# Patient Record
Sex: FEMALE | Race: WHITE
[De-identification: names, ages, dates, MRNs, and addresses within clinical notes are randomized per-mention and may not be internally consistent; named-entity substitution may affect disease eponyms.]

---

## 2017-11-19 ENCOUNTER — HOSPITAL ENCOUNTER (EMERGENCY)
Dept: HOSPITAL 76 - ED | Age: 22
Discharge: HOME | End: 2017-11-19
Payer: MEDICAID

## 2017-11-19 VITALS — DIASTOLIC BLOOD PRESSURE: 71 MMHG | SYSTOLIC BLOOD PRESSURE: 135 MMHG

## 2017-11-19 DIAGNOSIS — R55: Primary | ICD-10-CM

## 2017-11-19 LAB
ALBUMIN/GLOB SERPL: 1.1 {RATIO} (ref 1–2.2)
ANION GAP SERPL CALCULATED.4IONS-SCNC: 12 MMOL/L (ref 6–13)
BASOPHILS NFR BLD AUTO: 0.2 10^3/UL (ref 0–0.1)
BASOPHILS NFR BLD AUTO: 1.2 %
BILIRUB BLD-MCNC: 0.8 MG/DL (ref 0.2–1)
BUN SERPL-MCNC: 15 MG/DL (ref 6–20)
CALCIUM UR-MCNC: 9.2 MG/DL (ref 8.5–10.3)
CHLORIDE SERPL-SCNC: 102 MMOL/L (ref 101–111)
CO2 SERPL-SCNC: 24 MMOL/L (ref 21–32)
CREAT SERPLBLD-SCNC: 0.7 MG/DL (ref 0.4–1)
CUL URINE ADD CHARGE: (no result)
EOSINOPHIL # BLD AUTO: 0 10^3/UL (ref 0–0.7)
EOSINOPHIL NFR BLD AUTO: 0.2 %
ERYTHROCYTE [DISTWIDTH] IN BLOOD BY AUTOMATED COUNT: 12.5 % (ref 12–15)
GFRSERPLBLD MDRD-ARVRAT: 105 ML/MIN/{1.73_M2} (ref 89–?)
GLOBULIN SER-MCNC: 3.8 G/DL (ref 2.1–4.2)
GLUCOSE SERPL-MCNC: 119 MG/DL (ref 70–100)
HCG UR QL: NEGATIVE
HCT VFR BLD AUTO: 42 % (ref 37–47)
HGB UR QL STRIP: 14.2 G/DL (ref 12–16)
LIPASE SERPL-CCNC: 26 U/L (ref 22–51)
LYMPHOCYTES # SPEC AUTO: 1.8 10^3/UL (ref 1.5–3.5)
LYMPHOCYTES NFR BLD AUTO: 11.2 %
MCH RBC QN AUTO: 31.2 PG (ref 27–31)
MCHC RBC AUTO-ENTMCNC: 33.7 G/DL (ref 32–36)
MCV RBC AUTO: 92.6 FL (ref 81–99)
MONOCYTES # BLD AUTO: 1.3 10^3/UL (ref 0–1)
MONOCYTES NFR BLD AUTO: 8.2 %
NEUTROPHILS # BLD AUTO: 12.4 10^3/UL (ref 1.5–6.6)
NEUTROPHILS # SNV AUTO: 15.6 X10^3/UL (ref 4.8–10.8)
NEUTROPHILS NFR BLD AUTO: 79.2 %
NRBC # BLD AUTO: 0 /100WBC
PDW BLD AUTO: 8.3 FL (ref 7.9–10.8)
PH UR STRIP.AUTO: 6 PH (ref 5–7.5)
POTASSIUM SERPL-SCNC: 3.3 MMOL/L (ref 3.5–5)
PROT SPEC-MCNC: 8 G/DL (ref 6.7–8.2)
RBC MAR: 4.54 10^6/UL (ref 4.2–5.4)
SODIUM SERPLBLD-SCNC: 138 MMOL/L (ref 135–145)
SP GR UR STRIP.AUTO: 1.02 (ref 1–1.03)
UA CHARGE (STRIP ONLY): (no result)
UA W/ MICROSCOPIC CHARGE: YES
UR CULTURE IF IND: (no result)
UROBILINOGEN UR STRIP.AUTO-MCNC: (no result) MG/DL
WBC # BLD: 15.6 X10^3/UL
WBC # UR MANUAL: (no result) /HPF (ref 0–5)

## 2017-11-19 PROCEDURE — 81003 URINALYSIS AUTO W/O SCOPE: CPT

## 2017-11-19 PROCEDURE — 96360 HYDRATION IV INFUSION INIT: CPT

## 2017-11-19 PROCEDURE — 36415 COLL VENOUS BLD VENIPUNCTURE: CPT

## 2017-11-19 PROCEDURE — 84484 ASSAY OF TROPONIN QUANT: CPT

## 2017-11-19 PROCEDURE — 80053 COMPREHEN METABOLIC PANEL: CPT

## 2017-11-19 PROCEDURE — 71010: CPT

## 2017-11-19 PROCEDURE — 83690 ASSAY OF LIPASE: CPT

## 2017-11-19 PROCEDURE — 71275 CT ANGIOGRAPHY CHEST: CPT

## 2017-11-19 PROCEDURE — 81025 URINE PREGNANCY TEST: CPT

## 2017-11-19 PROCEDURE — 85379 FIBRIN DEGRADATION QUANT: CPT

## 2017-11-19 PROCEDURE — 99283 EMERGENCY DEPT VISIT LOW MDM: CPT

## 2017-11-19 PROCEDURE — 87086 URINE CULTURE/COLONY COUNT: CPT

## 2017-11-19 PROCEDURE — 81001 URINALYSIS AUTO W/SCOPE: CPT

## 2017-11-19 PROCEDURE — 99284 EMERGENCY DEPT VISIT MOD MDM: CPT

## 2017-11-19 PROCEDURE — 93005 ELECTROCARDIOGRAM TRACING: CPT

## 2017-11-19 PROCEDURE — 85025 COMPLETE CBC W/AUTO DIFF WBC: CPT

## 2017-11-19 NOTE — XRAY REPORT
EXAM: 

CHEST RADIOGRAPHY

 

EXAM DATE: 11/19/2017 08:46 PM.

 

CLINICAL HISTORY: Syncope.

 

COMPARISON: None.

 

TECHNIQUE: 1 view.

 

FINDINGS:

Lungs/Pleura: No focal opacities evident. No pleural effusion. No pneumothorax.

 

Mediastinum: Within exam limitations, the cardiomediastinal contour is normal.

 

Other: None.

 

IMPRESSION: Normal single view chest.

 

[facility]

Referring Provider Line: [phone]

 

SITE ID: [id number]

## 2017-11-19 NOTE — XRAY PRELIMINARY REPORT
Accession: [id number]

Exam: XR CHEST 1 VIEW

 

IMPRESSION: Normal single view chest.

 

[facility]

 

SITE ID: [id number]

## 2017-11-19 NOTE — ED PHYSICIAN DOCUMENTATION
PD HPI SYNCOPE





- Stated complaint


Stated Complaint: SYNCOPE





- Chief complaint


Chief Complaint: Neuro





- History obtained from


History obtained from: Patient, Family





- History of Present Illness


Witnessed: Witnessed


Timing - onset: Today


Duration: Seconds (5)


Preceding symptoms: Light headed, Generalized weakness, Other (nausea)


Associated symptoms: No: Seizure, Incontinant of urine, Incontinant of stool, 

Headache, Vision changes, Chest pain, Palpitations, Diaphoresis, Dyspnea, 

Abdominal pain


Contributing factors: Other (was in a hot shower)


Injury occurred: None.  No: Fell, Head injury, Neck injury, Bit tongue


Pain level max: 0


Pain level now: 0


Similar symptoms before: Has not had sx before


Recently seen: Not recently seen





Review of Systems


Constitutional: denies: Fever, Chills


GI: denies: Vomiting, Diarrhea


: denies: Dysuria, Frequency, Hesitancy, Now pregnant EGA


Skin: denies: Rash


Musculoskeletal: denies: Neck pain, Back pain


Neurologic: denies: Generalized weakness, Focal weakness, Numbness, Seizure, 

Confused, Altered mental status, Headache, Head injury, LOC





PD PAST MEDICAL HISTORY





- Past Medical History


Past Medical History: No





- Past Surgical History


Past Surgical History: No





- Present Medications


Home Medications: 


 Ambulatory Orders











 Medication  Instructions  Recorded  Confirmed


 


No Known Home Medications [No  11/19/17 11/19/17





Known Home Medications]   














- Allergies


Allergies/Adverse Reactions: 


 Allergies











Allergy/AdvReac Type Severity Reaction Status Date / Time


 


No Known Drug Allergies Allergy   Verified 11/19/17 19:29














- Social History


Does the pt smoke?: No


Smoking Status: Never smoker


Does the pt drink ETOH?: No


Does the pt have substance abuse?: No





- Immunizations


Immunizations are current?: Yes





PD ED PE NORMAL





- Vitals


Vital signs reviewed: Yes





- General


General: Alert and oriented X 3, No acute distress, Well developed/nourished





- HEENT


HEENT: Atraumatic, PERRL, Ears normal, Moist mucous membranes





- Neck


Neck: Supple, no meningeal sign, No bony TTP





- Cardiac


Cardiac: RRR, No murmur, Strong equal pulses





- Respiratory


Respiratory: No respiratory distress, Clear bilaterally





- Abdomen


Abdomen: Soft, Non tender, Non distended





- Back


Back: No CVA TTP, No spinal TTP





- Derm


Derm: Warm and dry, No rash





- Extremities


Extremities: No edema, No calf tenderness / cord





- Neuro


Neuro: Alert and oriented X 3, CNs 2-12 intact, No motor deficit, No sensory 

deficit, Normal speech


Eye Opening: Spontaneous


Motor: Obeys Commands


Verbal: Oriented


GCS Score: 15





- Psych


Psych: Normal mood, Normal affect





Results





- Vitals


Vitals: 


 Vital Signs - 24 hr











  11/19/17 11/19/17 11/19/17





  19:20 19:39 21:22


 


Temperature 37.1 C  37.2 C


 


Heart Rate 112 H 110 H 


 


Respiratory 18 18 





Rate   


 


Blood Pressure 142/102 H 140/86 H 


 


O2 Saturation 98 100 














  11/19/17





  22:05


 


Temperature 


 


Heart Rate 75


 


Respiratory 18





Rate 


 


Blood Pressure 139/72 H


 


O2 Saturation 97








 Oxygen











O2 Source                      Room air

















- EKG (time done)


  ** 2003


Rate: Rate (enter#) (76)


Rhythm: NSR (sinus arrhythmia)


Axis: Normal


Intervals: Normal DE


QRS: Normal


Ischemia: Normal ST segments


Computer interpretation: Agree with computer





- Labs


Labs: 


 Laboratory Tests











  11/19/17 11/19/17 11/19/17





  19:54 19:54 19:54


 


WBC  15.6 H  


 


RBC  4.54  


 


Hgb  14.2  


 


Hct  42.0  


 


MCV  92.6  


 


MCH  31.2 H  


 


MCHC  33.7  


 


RDW  12.5  


 


Plt Count  319  


 


MPV  8.3  


 


Neut #  12.4 H  


 


Lymph #  1.8  


 


Mono #  1.3 H  


 


Eos #  0.0  


 


Baso #  0.2 H  


 


Absolute Nucleated RBC  0.00  


 


Nucleated RBC %  0.0  


 


D-Dimer    536.4 H


 


Sodium   138 


 


Potassium   3.3 L 


 


Chloride   102 


 


Carbon Dioxide   24 


 


Anion Gap   12.0 


 


BUN   15 


 


Creatinine   0.7 


 


Estimated GFR (MDRD)   105 


 


Glucose   119 H 


 


Calcium   9.2 


 


Total Bilirubin   0.8 


 


AST   20 


 


ALT   14 


 


Alkaline Phosphatase   39 L 


 


Troponin I   


 


Total Protein   8.0 


 


Albumin   4.2 


 


Globulin   3.8 


 


Albumin/Globulin Ratio   1.1 


 


Lipase   26 


 


Urine Color   


 


Urine Clarity   


 


Urine pH   


 


Ur Specific Gravity   


 


Urine Protein   


 


Urine Glucose (UA)   


 


Urine Ketones   


 


Urine Occult Blood   


 


Urine Nitrite   


 


Urine Bilirubin   


 


Urine Urobilinogen   


 


Ur Leukocyte Esterase   


 


Urine RBC   


 


Urine WBC   


 


Ur Squamous Epith Cells   


 


Urine Bacteria   


 


Ur Microscopic Review   


 


Urine Culture Comments   


 


Urine HCG, Qual   














  11/19/17 11/19/17





  19:54 20:25


 


WBC  


 


RBC  


 


Hgb  


 


Hct  


 


MCV  


 


MCH  


 


MCHC  


 


RDW  


 


Plt Count  


 


MPV  


 


Neut #  


 


Lymph #  


 


Mono #  


 


Eos #  


 


Baso #  


 


Absolute Nucleated RBC  


 


Nucleated RBC %  


 


D-Dimer  


 


Sodium  


 


Potassium  


 


Chloride  


 


Carbon Dioxide  


 


Anion Gap  


 


BUN  


 


Creatinine  


 


Estimated GFR (MDRD)  


 


Glucose  


 


Calcium  


 


Total Bilirubin  


 


AST  


 


ALT  


 


Alkaline Phosphatase  


 


Troponin I  < 0.04 


 


Total Protein  


 


Albumin  


 


Globulin  


 


Albumin/Globulin Ratio  


 


Lipase  


 


Urine Color   YELLOW


 


Urine Clarity   CLOUDY


 


Urine pH   6.0


 


Ur Specific Gravity   1.025


 


Urine Protein   TRACE


 


Urine Glucose (UA)   NEGATIVE


 


Urine Ketones   TRACE


 


Urine Occult Blood   SMALL H


 


Urine Nitrite   NEGATIVE


 


Urine Bilirubin   SMALL H


 


Urine Urobilinogen   0.2 (NORMAL)


 


Ur Leukocyte Esterase   NEGATIVE


 


Urine RBC   6-10 H


 


Urine WBC   4-5


 


Ur Squamous Epith Cells   NONE SEEN


 


Urine Bacteria   Rare


 


Ur Microscopic Review   INDICATED


 


Urine Culture Comments   NOT INDICATED


 


Urine HCG, Qual   NEGATIVE














- Rads (name of study)


  ** cxr


Radiology: Prelim report reviewed, EMP read contemporaneously, See rad report (

normal)





  ** CT PA


Radiology: Prelim report reviewed, EMP read contemporaneously, See rad report (

No evidence for pulmonary emboli. Mild to moderate motion artifact limited. No 

acute findings are seen)





PD MEDICAL DECISION MAKING





- ED course


Complexity details: reviewed results, re-evaluated patient, considered 

differential, d/w patient, d/w family


ED course: 


Patient is a 22-year-old female who presents to the emergency department with 

syncope today.  This was occurred while she was in the shower, likely 

vasovagal.  She is well-appearing, nontoxic.  No acute findings on EKG.  Her 

white blood cell count was elevated and she was tachycardic and denied any 

recent illnesses originally, therefore a d-dimer was performed which was 

elevated.  CT scan was then undertaken which is negative for PE. Patient then 

stated that she had been sick with a viral URI over the past few weeks.  We 

will continue supportive care and follow-up with her PCP.  Patient counseled 

regarding signs and symptoms for which I believe and urgent re-evaluation would 

be necessary. Patient with good understanding of and agreement to plan and is 

comfortable going home at this time





This document was made in part using voice recognition software. While efforts 

are made to proofread this document, sound alike and grammatical errors may 

occur.





Departure





- Departure


Disposition: 01 Home, Self Care


Clinical Impression: 


 Vasovagal syncope





Condition: Good


Instructions:  ED Syncope Vasovagal


Follow-Up: 


Jovita Farmer MD [Primary Care Provider] - Within 1 week


Comments: 


Drink plenty of fluids at home. Your tests are normal tonight.

## 2017-11-19 NOTE — CT PRELIMINARY REPORT
Accession: [id number]

Exam: CT CHEST ANGIO (PE)

 

IMPRESSION: 

1. No evidence for pulmonary emboli. Mild to moderate motion artifact limited. No acute findings are 
seen.

 

[facility]

 

SITE ID: [id number]

## 2017-11-19 NOTE — CT REPORT
EXAM:

CT ANGIOGRAM CHEST

 

EXAM DATE: 11/19/2017 09:49 PM.

 

CLINICAL HISTORY: Syncope, elevated d-dimer.

 

COMPARISON: None.

 

TECHNIQUE: Routine helical imaging was performed through the chest in the pulmonary arterial phase. I
V Contrast: 80 mL Isovue 300. Reconstructions: Coronal 3-D MIP reconstructions.Sagittal and coronal.

 

In accordance with CT protocol optimization, one or more of the following dose reduction techniques w
ere utilized for this exam: automated exposure control, adjustment of mA and/or KV based on patient s
ize, or use of iterative reconstructive technique.

 

FINDINGS: 

Pulmonary Arteries: No evidence for pulmonary emboli. Mild to moderate motion artifact limited.

 

Lungs/Pleura: No consolidation, nodules, or edema. No effusions or pneumothorax. Motion artifact limi
tai.

 

Mediastinum: Normal. No cardiac enlargement or adenopathy. 

 

Thoracic Aorta: Unremarkable.

 

Upper Abdomen: Unremarkable.

 

 

IMPRESSION: 

1. No evidence for pulmonary emboli. Mild to moderate motion artifact limited. No acute findings are 
seen.

 

[facility]

Referring Provider Line: [phone]

 

SITE ID: [id number]

## 2022-01-28 ENCOUNTER — HOSPITAL ENCOUNTER (EMERGENCY)
Dept: HOSPITAL 76 - ED | Age: 27
Discharge: HOME | End: 2022-01-28
Payer: MEDICAID

## 2022-01-28 VITALS — SYSTOLIC BLOOD PRESSURE: 142 MMHG | DIASTOLIC BLOOD PRESSURE: 87 MMHG

## 2022-01-28 DIAGNOSIS — R42: ICD-10-CM

## 2022-01-28 DIAGNOSIS — G89.18: Primary | ICD-10-CM

## 2022-01-28 DIAGNOSIS — R51.9: ICD-10-CM

## 2022-01-28 DIAGNOSIS — H53.8: ICD-10-CM

## 2022-01-28 LAB
ALBUMIN DIAFP-MCNC: 3.4 G/DL (ref 3.2–5.5)
ALBUMIN/GLOB SERPL: 0.8 {RATIO} (ref 1–2.2)
ALP SERPL-CCNC: 105 IU/L (ref 42–121)
ALT SERPL W P-5'-P-CCNC: 18 IU/L (ref 10–60)
ANION GAP SERPL CALCULATED.4IONS-SCNC: 11 MMOL/L (ref 6–13)
AST SERPL W P-5'-P-CCNC: 17 IU/L (ref 10–42)
BASOPHILS NFR BLD AUTO: 0.1 10^3/UL (ref 0–0.1)
BASOPHILS NFR BLD AUTO: 0.9 %
BILIRUB BLD-MCNC: 0.4 MG/DL (ref 0.2–1)
BUN SERPL-MCNC: 19 MG/DL (ref 6–20)
CALCIUM UR-MCNC: 9.6 MG/DL (ref 8.5–10.3)
CHLORIDE SERPL-SCNC: 105 MMOL/L (ref 101–111)
CLARITY UR REFRACT.AUTO: (no result)
CO2 SERPL-SCNC: 22 MMOL/L (ref 21–32)
CREAT SERPLBLD-SCNC: 0.6 MG/DL (ref 0.4–1)
EOSINOPHIL # BLD AUTO: 0.2 10^3/UL (ref 0–0.7)
EOSINOPHIL NFR BLD AUTO: 1.5 %
ERYTHROCYTE [DISTWIDTH] IN BLOOD BY AUTOMATED COUNT: 16 % (ref 12–15)
GFRSERPLBLD MDRD-ARVRAT: 121 ML/MIN/{1.73_M2} (ref 89–?)
GLOBULIN SER-MCNC: 4.4 G/DL (ref 2.1–4.2)
GLUCOSE SERPL-MCNC: 100 MG/DL (ref 70–100)
GLUCOSE UR QL STRIP.AUTO: NEGATIVE MG/DL
HCT VFR BLD AUTO: 34.2 % (ref 37–47)
HGB UR QL STRIP: 11.6 G/DL (ref 12–16)
KETONES UR QL STRIP.AUTO: NEGATIVE MG/DL
LYMPHOCYTES # SPEC AUTO: 2.7 10^3/UL (ref 1.5–3.5)
LYMPHOCYTES NFR BLD AUTO: 22.9 %
MAGNESIUM SERPL-MCNC: 2 MG/DL (ref 1.7–2.8)
MCH RBC QN AUTO: 30.1 PG (ref 27–31)
MCHC RBC AUTO-ENTMCNC: 33.9 G/DL (ref 32–36)
MCV RBC AUTO: 88.6 FL (ref 81–99)
MONOCYTES # BLD AUTO: 0.9 10^3/UL (ref 0–1)
MONOCYTES NFR BLD AUTO: 8 %
NEUTROPHILS # BLD AUTO: 7.7 10^3/UL (ref 1.5–6.6)
NEUTROPHILS # SNV AUTO: 11.7 X10^3/UL (ref 4.8–10.8)
NEUTROPHILS NFR BLD AUTO: 65.8 %
NITRITE UR QL STRIP.AUTO: NEGATIVE
NRBC # BLD AUTO: 0 /100WBC
NRBC # BLD AUTO: 0 X10^3/UL
PDW BLD AUTO: 8.6 FL (ref 7.9–10.8)
PH UR STRIP.AUTO: 5.5 PH (ref 5–7.5)
PLATELET # BLD: 551 10^3/UL (ref 130–450)
POTASSIUM SERPL-SCNC: 3.8 MMOL/L (ref 3.5–5)
PROT SPEC-MCNC: 7.8 G/DL (ref 6.7–8.2)
PROT UR STRIP.AUTO-MCNC: NEGATIVE MG/DL
RBC # UR STRIP.AUTO: (no result) /UL
RBC # URNS HPF: (no result) /HPF (ref 0–5)
RBC MAR: 3.86 10^6/UL (ref 4.2–5.4)
SODIUM SERPLBLD-SCNC: 138 MMOL/L (ref 135–145)
SP GR UR STRIP.AUTO: >=1.03 (ref 1–1.03)
SQUAMOUS URNS QL MICRO: (no result)
UROBILINOGEN UR QL STRIP.AUTO: (no result) E.U./DL
UROBILINOGEN UR STRIP.AUTO-MCNC: NEGATIVE MG/DL
WBC # UR MANUAL: (no result) /HPF (ref 0–5)

## 2022-01-28 PROCEDURE — 99284 EMERGENCY DEPT VISIT MOD MDM: CPT

## 2022-01-28 PROCEDURE — 84156 ASSAY OF PROTEIN URINE: CPT

## 2022-01-28 PROCEDURE — 87086 URINE CULTURE/COLONY COUNT: CPT

## 2022-01-28 PROCEDURE — 81001 URINALYSIS AUTO W/SCOPE: CPT

## 2022-01-28 PROCEDURE — 80053 COMPREHEN METABOLIC PANEL: CPT

## 2022-01-28 PROCEDURE — 83735 ASSAY OF MAGNESIUM: CPT

## 2022-01-28 PROCEDURE — 36415 COLL VENOUS BLD VENIPUNCTURE: CPT

## 2022-01-28 PROCEDURE — 81003 URINALYSIS AUTO W/O SCOPE: CPT

## 2022-01-28 PROCEDURE — 85025 COMPLETE CBC W/AUTO DIFF WBC: CPT

## 2022-01-28 NOTE — ED PHYSICIAN DOCUMENTATION
History of Present Illness





- Stated complaint


Stated Complaint: BLURRY VISION/POST C SECTION





- Chief complaint


Chief Complaint: Neuro





- History obtained from


History obtained from: Patient





- Additonal information


Additional information: 





G1, P1 previously healthy, with PMH of iron deficiency anemia only during 

pregnancy, status post   [facility] from [facility] [place] (went into labor at 41 weeks gestation c/by peripartum 

hypertension and underwent  for failure to progress) p/w transient 

nausea, headache, lightheadedness yesterday and 30 min episode of blurry vision 

today, now resolved. sent in by her ob gyn. patient reports she had 

uncomplicated pregnancy prior to delivery, had uncomplicated c section and was 

dc'd after 36h. urinating and defecating normally. denies pain or fever. infant 

is also doing well and had no nicu stay. 





Review of Systems


Ten Systems: 10 systems reviewed and negative


Constitutional: denies: Fever, Chills


Cardiac: denies: Chest pain / pressure


Respiratory: denies: Dyspnea, Cough


GI: reports: Nausea.  denies: Abdominal Pain, Vomiting


: denies: Dysuria


Neurologic: reports: Headache, Other (vision changes)





PD PAST MEDICAL HISTORY





- Past Surgical History


Past Surgical History: No





- Present Medications


Home Medications: 


                                Ambulatory Orders











 Medication  Instructions  Recorded  Confirmed


 


No Known Home Medications  17














- Allergies


Allergies/Adverse Reactions: 


                                    Allergies











Allergy/AdvReac Type Severity Reaction Status Date / Time


 


No Known Drug Allergies Allergy   Verified 22 20:19














- Social History


Does the pt smoke?: No


Smoking Status: Never smoker


Does the pt drink ETOH?: No


Does the pt have substance abuse?: No





- Immunizations


Immunizations are current?: Yes





PD ED PE NORMAL





- Vitals


Vital signs reviewed: Yes





- General


General: Alert and oriented X 3, No acute distress, Well developed/nourished





- HEENT


HEENT: Atraumatic, PERRL, EOMI





- Neck


Neck: Supple, no meningeal sign





- Cardiac


Cardiac: RRR





- Respiratory


Respiratory: No respiratory distress, Clear bilaterally





- Abdomen


Abdomen: Non tender, Non distended, Other (horizontal incision site at lower 

abdomen clean, nontender, healing well)





- Back


Back: No CVA TTP





- Derm


Derm: Normal color, Warm and dry





- Extremities


Extremities: No deformity, No edema





- Neuro


Neuro: Alert and oriented X 3, No motor deficit, No sensory deficit, Normal 

speech





- Psych


Psych: Normal mood, Normal affect





Results





- Vitals


Vitals: 


                               Vital Signs - 24 hr











  22





  20:14 20:18 20:35


 


Temperature 36.2 C L  


 


Heart Rate 101 H 93 61


 


Respiratory 18 22 20





Rate   


 


Blood Pressure 143/88 H 134/92 H 142/87 H


 


O2 Saturation 97 98 98














  22





  21:42


 


Temperature 


 


Heart Rate 60


 


Respiratory 20





Rate 


 


Blood Pressure 142/87 H


 


O2 Saturation 98








                                     Oxygen











O2 Source                      Room air

















- Labs


Labs: 


                                Laboratory Tests











  22





  20:40 20:40 20:45


 


WBC  11.7 H  


 


RBC  3.86 L  


 


Hgb  11.6 L  


 


Hct  34.2 L  


 


MCV  88.6  


 


MCH  30.1  


 


MCHC  33.9  


 


RDW  16.0 H  


 


Plt Count  551 H  


 


MPV  8.6  


 


Neut # (Auto)  7.7 H  


 


Lymph # (Auto)  2.7  


 


Mono # (Auto)  0.9  


 


Eos # (Auto)  0.2  


 


Baso # (Auto)  0.1  


 


Absolute Nucleated RBC  0.00  


 


Nucleated RBC %  0.0  


 


Sodium   138 


 


Potassium   3.8 


 


Chloride   105 


 


Carbon Dioxide   22 


 


Anion Gap   11.0 


 


BUN   19 


 


Creatinine   0.6 


 


Estimated GFR (MDRD)   121 


 


Glucose   100 


 


Calcium   9.6 


 


Magnesium   2.0 


 


Total Bilirubin   0.4 


 


AST   17 


 


ALT   18 


 


Alkaline Phosphatase   105 


 


Total Protein   7.8 


 


Albumin   3.4 


 


Globulin   4.4 H 


 


Albumin/Globulin Ratio   0.8 L 


 


Urine Color    YELLOW


 


Urine Clarity    HAZY


 


Urine pH    5.5


 


Ur Specific Gravity    >=1.030 H


 


Urine Protein    NEGATIVE


 


Urine Glucose (UA)    NEGATIVE


 


Urine Ketones    NEGATIVE


 


Urine Occult Blood    LARGE H


 


Urine Nitrite    NEGATIVE


 


Urine Bilirubin    NEGATIVE


 


Urine Urobilinogen    0.2 (NORMAL)


 


Ur Leukocyte Esterase    NEGATIVE


 


Urine RBC    6-10 H


 


Urine WBC    0-3


 


Ur Squamous Epith Cells    FEW Squamous


 


Amorphous Sediment    Few


 


Urine Bacteria    Few


 


Ur Microscopic Review    INDICATED


 


Urine Culture Comments    NOT INDICATED


 


U Random Total Protein   














  22





  20:45


 


WBC 


 


RBC 


 


Hgb 


 


Hct 


 


MCV 


 


MCH 


 


MCHC 


 


RDW 


 


Plt Count 


 


MPV 


 


Neut # (Auto) 


 


Lymph # (Auto) 


 


Mono # (Auto) 


 


Eos # (Auto) 


 


Baso # (Auto) 


 


Absolute Nucleated RBC 


 


Nucleated RBC % 


 


Sodium 


 


Potassium 


 


Chloride 


 


Carbon Dioxide 


 


Anion Gap 


 


BUN 


 


Creatinine 


 


Estimated GFR (MDRD) 


 


Glucose 


 


Calcium 


 


Magnesium 


 


Total Bilirubin 


 


AST 


 


ALT 


 


Alkaline Phosphatase 


 


Total Protein 


 


Albumin 


 


Globulin 


 


Albumin/Globulin Ratio 


 


Urine Color 


 


Urine Clarity 


 


Urine pH 


 


Ur Specific Gravity 


 


Urine Protein 


 


Urine Glucose (UA) 


 


Urine Ketones 


 


Urine Occult Blood 


 


Urine Nitrite 


 


Urine Bilirubin 


 


Urine Urobilinogen 


 


Ur Leukocyte Esterase 


 


Urine RBC 


 


Urine WBC 


 


Ur Squamous Epith Cells 


 


Amorphous Sediment 


 


Urine Bacteria 


 


Ur Microscopic Review 


 


Urine Culture Comments 


 


U Random Total Protein  15














PD MEDICAL DECISION MAKING





- ED course


ED course: 





26yF  presents 8 days postpartum c section with vision changes, nausea, 

headache, dizziness, sent by ob/gyn for evaluation. Asymptomatic at present. 

Will obtain preeclampsia screening labs, monitor vitals, reassess. 





Patient's BP consistently in 140s/90s. previous 2017 BPs documented were 

142/102, 140/86. Patient completely asymptomatic here. contacted [facility] and spoke with arya Headife with the group patient sees in regards to 

normal labs, asymptomatic patient presentation. Also d/w our ob gyn on call, Dr. Liu who recommends home BP monitoring and preeclampsia return precautions. 





d/w patient, return precautions given. plan to f/u with [facility]. 





Departure





- Departure


Disposition: 01 Home, Self Care


Clinical Impression: 


 Headache, Vision changes, Dizziness, Nausea





Condition: Good


Instructions:  Birth 


Comments: 


You were seen in the emergency department for evaluation for preeclampsia after 

a c section. Your blood pressure has been in the 140s / 90s in the ED and your 

labwork and urine tests were normal. Please follow up with your ob gyn this 

week. Return to the ED if your systolic blood pressure (top number) goes above 

149 or your diastolic blood pressure (bottom number) goes above 99, if you have 

right upper abdominal pain, persistent dizziness, black/bright spots in your 

vision, headache that doesn't go away with tylenol, or any other concerns.